# Patient Record
Sex: FEMALE
[De-identification: names, ages, dates, MRNs, and addresses within clinical notes are randomized per-mention and may not be internally consistent; named-entity substitution may affect disease eponyms.]

---

## 2019-03-31 ENCOUNTER — HOSPITAL ENCOUNTER (INPATIENT)
Dept: HOSPITAL 90 - EDH | Age: 54
LOS: 1 days | Discharge: HOME | DRG: 761 | End: 2019-04-01
Attending: OBSTETRICS & GYNECOLOGY | Admitting: OBSTETRICS & GYNECOLOGY
Payer: SELF-PAY

## 2019-03-31 VITALS — SYSTOLIC BLOOD PRESSURE: 115 MMHG | DIASTOLIC BLOOD PRESSURE: 48 MMHG

## 2019-03-31 VITALS — DIASTOLIC BLOOD PRESSURE: 64 MMHG | SYSTOLIC BLOOD PRESSURE: 133 MMHG

## 2019-03-31 VITALS — DIASTOLIC BLOOD PRESSURE: 48 MMHG | SYSTOLIC BLOOD PRESSURE: 117 MMHG

## 2019-03-31 VITALS — HEIGHT: 60 IN | WEIGHT: 168 LBS | BODY MASS INDEX: 32.98 KG/M2

## 2019-03-31 VITALS — DIASTOLIC BLOOD PRESSURE: 58 MMHG | SYSTOLIC BLOOD PRESSURE: 113 MMHG

## 2019-03-31 VITALS — DIASTOLIC BLOOD PRESSURE: 59 MMHG | SYSTOLIC BLOOD PRESSURE: 133 MMHG

## 2019-03-31 VITALS — SYSTOLIC BLOOD PRESSURE: 113 MMHG | DIASTOLIC BLOOD PRESSURE: 55 MMHG

## 2019-03-31 VITALS — SYSTOLIC BLOOD PRESSURE: 109 MMHG | DIASTOLIC BLOOD PRESSURE: 57 MMHG

## 2019-03-31 VITALS — SYSTOLIC BLOOD PRESSURE: 124 MMHG | DIASTOLIC BLOOD PRESSURE: 66 MMHG

## 2019-03-31 DIAGNOSIS — N92.0: Primary | ICD-10-CM

## 2019-03-31 DIAGNOSIS — I10: ICD-10-CM

## 2019-03-31 DIAGNOSIS — D50.0: ICD-10-CM

## 2019-03-31 LAB
ALBUMIN SERPL-MCNC: 3.4 G/DL (ref 3.5–5)
ALT SERPL-CCNC: 19 U/L (ref 12–78)
APTT PPP: 18.9 SEC (ref 26.3–35.5)
AST SERPL-CCNC: 18 U/L (ref 10–37)
BASOPHILS NFR BLD AUTO: 0.7 % (ref 0–5)
BILIRUB SERPL-MCNC: 0.2 MG/DL (ref 0.2–1)
BUN SERPL-MCNC: 10 MG/DL (ref 7–18)
CHLORIDE SERPL-SCNC: 102 MMOL/L (ref 101–111)
CK SERPL-CCNC: 59 U/L (ref 21–232)
CO2 SERPL-SCNC: 28 MMOL/L (ref 21–32)
CREAT SERPL-MCNC: 0.8 MG/DL (ref 0.5–1.5)
EOSINOPHIL NFR BLD AUTO: 0.4 % (ref 0–8)
ERYTHROCYTE [DISTWIDTH] IN BLOOD BY AUTOMATED COUNT: 15.9 % (ref 11–15.5)
GFR SERPL CREATININE-BSD FRML MDRD: 80 ML/MIN (ref 60–?)
GLUCOSE SERPL-MCNC: 145 MG/DL (ref 70–105)
HCT VFR BLD AUTO: 19.5 % (ref 36–48)
INR PPP: 0.94 (ref 0.85–1.15)
LYMPHOCYTES NFR SPEC AUTO: 10 % (ref 21–51)
MCH RBC QN AUTO: 27.2 PG (ref 27–33)
MCHC RBC AUTO-ENTMCNC: 31.4 G/DL (ref 32–36)
MCV RBC AUTO: 86.4 FL (ref 79–99)
MONOCYTES NFR BLD AUTO: 6.5 % (ref 3–13)
NEUTROPHILS NFR BLD AUTO: 82.4 % (ref 40–77)
NRBC BLD MANUAL-RTO: 0.1 % (ref 0–0.19)
PLATELET # BLD AUTO: 390 K/UL (ref 130–400)
POTASSIUM SERPL-SCNC: 3.5 MMOL/L (ref 3.5–5.1)
PROT SERPL-MCNC: 7.1 G/DL (ref 6–8.3)
PROTHROMBIN TIME: 9.9 SEC (ref 9.6–11.6)
RBC # BLD AUTO: 2.25 MIL/UL (ref 4–5.5)
RBC MORPH BLD: (no result)
SODIUM SERPL-SCNC: 139 MMOL/L (ref 136–145)
WBC # BLD AUTO: 11.7 K/UL (ref 4.8–10.8)

## 2019-03-31 PROCEDURE — 86900 BLOOD TYPING SEROLOGIC ABO: CPT

## 2019-03-31 PROCEDURE — 36430 TRANSFUSION BLD/BLD COMPNT: CPT

## 2019-03-31 PROCEDURE — 85014 HEMATOCRIT: CPT

## 2019-03-31 PROCEDURE — 86922 COMPATIBILITY TEST ANTIGLOB: CPT

## 2019-03-31 PROCEDURE — 81025 URINE PREGNANCY TEST: CPT

## 2019-03-31 PROCEDURE — 85025 COMPLETE CBC W/AUTO DIFF WBC: CPT

## 2019-03-31 PROCEDURE — 36415 COLL VENOUS BLD VENIPUNCTURE: CPT

## 2019-03-31 PROCEDURE — 84484 ASSAY OF TROPONIN QUANT: CPT

## 2019-03-31 PROCEDURE — 86850 RBC ANTIBODY SCREEN: CPT

## 2019-03-31 PROCEDURE — 85610 PROTHROMBIN TIME: CPT

## 2019-03-31 PROCEDURE — 85730 THROMBOPLASTIN TIME PARTIAL: CPT

## 2019-03-31 PROCEDURE — 86901 BLOOD TYPING SEROLOGIC RH(D): CPT

## 2019-03-31 PROCEDURE — 30233N1 TRANSFUSION OF NONAUTOLOGOUS RED BLOOD CELLS INTO PERIPHERAL VEIN, PERCUTANEOUS APPROACH: ICD-10-PCS | Performed by: INTERNAL MEDICINE

## 2019-03-31 PROCEDURE — 93005 ELECTROCARDIOGRAM TRACING: CPT

## 2019-03-31 PROCEDURE — 80053 COMPREHEN METABOLIC PANEL: CPT

## 2019-03-31 PROCEDURE — 85018 HEMOGLOBIN: CPT

## 2019-03-31 PROCEDURE — 76830 TRANSVAGINAL US NON-OB: CPT

## 2019-03-31 PROCEDURE — 82550 ASSAY OF CK (CPK): CPT

## 2019-03-31 PROCEDURE — 71046 X-RAY EXAM CHEST 2 VIEWS: CPT

## 2019-03-31 RX ADMIN — Medication SCH MLS/HR: at 23:40

## 2019-03-31 NOTE — NUR
PROCEDURE



TRANSVAGINAL EXAM IN PROGRESS, PT HAS BEEN ASSISTED TO BR TO VOID W/O ANY COMPLAINTS, SCANT 
VAGINAL BLEEDING NOTED, ENCOURAGED TO PUSH FLUIDS

-------------------------------------------------------------------------------

Addendum: 03/31/19 at 2209 by LÓPEZ ASH LVN

-------------------------------------------------------------------------------

Amended: Links added.

## 2019-03-31 NOTE — NUR
BLOOD TRANSFUSION



V/S WNL, PT ADVISED TO CALL NURSE FOR ANY ADVERSE REACTIONS, ACKNOWLEDGES UNDERSTANDING

-------------------------------------------------------------------------------

Addendum: 03/31/19 at 2213 by LÓPEZ ASH LVN

-------------------------------------------------------------------------------

Amended: Links added.

## 2019-04-01 VITALS — SYSTOLIC BLOOD PRESSURE: 126 MMHG | DIASTOLIC BLOOD PRESSURE: 71 MMHG

## 2019-04-01 VITALS — SYSTOLIC BLOOD PRESSURE: 126 MMHG | DIASTOLIC BLOOD PRESSURE: 63 MMHG

## 2019-04-01 VITALS — DIASTOLIC BLOOD PRESSURE: 73 MMHG | SYSTOLIC BLOOD PRESSURE: 124 MMHG

## 2019-04-01 VITALS — DIASTOLIC BLOOD PRESSURE: 72 MMHG | SYSTOLIC BLOOD PRESSURE: 131 MMHG

## 2019-04-01 LAB
HCT VFR BLD AUTO: 24.7 % (ref 36–48)
HCT VFR BLD AUTO: 26 % (ref 36–48)

## 2019-04-01 RX ADMIN — Medication SCH MLS/HR: at 13:00

## 2019-04-01 RX ADMIN — Medication SCH MLS/HR: at 00:34

## 2019-04-01 RX ADMIN — Medication SCH MLS/HR: at 06:52

## 2019-04-01 NOTE — NUR
ROUNDS

DR IDANIA ZARAGOZA AT BEDSIDE, ANSWERED ALL PT'S QUESTIONS AND CONCERNS; POC DISCUSSED, PT 
VERBALIZED UNDERSTANDING

## 2019-04-01 NOTE — NUR
VAGINAL BLEEDING



PAD HAD SCANT AMT PINKISH BLOOD,  URINE RED, BLOODY

-------------------------------------------------------------------------------

Addendum: 04/01/19 at 0143 by LÓPEZ ASH LVN

-------------------------------------------------------------------------------

Amended: Links added.

## 2019-04-01 NOTE — NUR
PAD COUNT

PT CHANGED PAD AFTER VOIDING, SCANT BLOOD NOTED ON PAD, WILL CONTINUE TO MONITOR

-------------------------------------------------------------------------------

Addendum: 04/01/19 at 1022 by FIORELLA RICKS RN

-------------------------------------------------------------------------------

Amended: Links added.

## 2019-04-01 NOTE — NUR
ACTIVITY



ASSISTED UP TO BR TO VOID, TOLERATED WELL, NO C/O DIZZINESS, BLOOD TRANSFUSION COMPLETE

-------------------------------------------------------------------------------

Addendum: 04/01/19 at 0131 by LÓPEZ ASH LVN

-------------------------------------------------------------------------------

Amended: Links added.

## 2019-04-01 NOTE — NUR
ACTIVITY

PT WAS ABLE TO AMBULATE TO BATHROOM, MINIMAL ASSISTANCE NEEDED; DID OWN KYLE-CARE, WAS ABLE 
TO VOID, AMBULATED BACK TO BED, TOLERATED WELL

## 2019-04-01 NOTE — NUR
Pt to bathroom, voiding without pain or discomfort; urine pink tinged; jackelyn pad noted with 
scant bleeding.  Pt providing self jackelyn care then returning to bed; denies dizziness, 
weakness or discomfort.  Pt requesting jello after eating half of sandwich provided; denies 
n/v at present.

-------------------------------------------------------------------------------

Addendum: 04/01/19 at 0509 by DERRICK LAYTON RN RN

-------------------------------------------------------------------------------

Amended: Links added.

## 2019-04-01 NOTE — NUR
ACTIVITY

PT AMBULATED TO BATHROOM, NO ASSISTANCE NEEDED BUT NEAR PT, PT WAS ABLE TO VOID 400+mL OF 
CLEAR PALE YELLOW URINE, WASHED HANDS, WAS ABLE TO AMBULATE BACK TO BED, NO DIZZINESS, 
FATIGUE NOTED, NO SOB; PT FEELS WELL, NO C/O PAIN

## 2019-04-01 NOTE — NUR
AVILA report received from CARLOS Nation LVN.

-------------------------------------------------------------------------------

Addendum: 04/01/19 at 0504 by DERRICK LAYTON RN RN

-------------------------------------------------------------------------------

Amended: Links added.

## 2019-04-07 ENCOUNTER — HOSPITAL ENCOUNTER (INPATIENT)
Dept: HOSPITAL 90 - EDH | Age: 54
LOS: 1 days | Discharge: HOME | DRG: 744 | End: 2019-04-08
Attending: INTERNAL MEDICINE | Admitting: INTERNAL MEDICINE
Payer: SELF-PAY

## 2019-04-07 VITALS — DIASTOLIC BLOOD PRESSURE: 47 MMHG | SYSTOLIC BLOOD PRESSURE: 100 MMHG

## 2019-04-07 VITALS — WEIGHT: 164 LBS | HEIGHT: 60 IN | BODY MASS INDEX: 32.2 KG/M2

## 2019-04-07 VITALS — SYSTOLIC BLOOD PRESSURE: 100 MMHG | DIASTOLIC BLOOD PRESSURE: 57 MMHG

## 2019-04-07 VITALS — SYSTOLIC BLOOD PRESSURE: 101 MMHG | DIASTOLIC BLOOD PRESSURE: 55 MMHG

## 2019-04-07 VITALS — SYSTOLIC BLOOD PRESSURE: 95 MMHG | DIASTOLIC BLOOD PRESSURE: 39 MMHG

## 2019-04-07 VITALS — SYSTOLIC BLOOD PRESSURE: 105 MMHG | DIASTOLIC BLOOD PRESSURE: 51 MMHG

## 2019-04-07 DIAGNOSIS — I95.9: ICD-10-CM

## 2019-04-07 DIAGNOSIS — T46.4X5A: ICD-10-CM

## 2019-04-07 DIAGNOSIS — N85.2: Primary | ICD-10-CM
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
## 2019-04-07 DIAGNOSIS — Z82.49: ICD-10-CM

## 2019-04-07 DIAGNOSIS — I10: ICD-10-CM

## 2019-04-07 DIAGNOSIS — N92.0: ICD-10-CM

## 2019-04-07 DIAGNOSIS — D62: ICD-10-CM

## 2019-04-07 LAB
ALBUMIN SERPL-MCNC: 3.6 G/DL (ref 3.5–5)
ALT SERPL-CCNC: 17 U/L (ref 12–78)
APPEARANCE UR: (no result)
APTT PPP: 21.9 SEC (ref 26.3–35.5)
AST SERPL-CCNC: 15 U/L (ref 10–37)
BASOPHILS NFR BLD AUTO: 0.5 % (ref 0–5)
BASOPHILS NFR BLD AUTO: 0.8 % (ref 0–5)
BILIRUB SERPL-MCNC: 0.2 MG/DL (ref 0.2–1)
BILIRUB UR QL STRIP: NEGATIVE
BUN SERPL-MCNC: 10 MG/DL (ref 7–18)
CHLORIDE SERPL-SCNC: 101 MMOL/L (ref 101–111)
CO2 SERPL-SCNC: 25 MMOL/L (ref 21–32)
COLOR UR: (no result)
CREAT SERPL-MCNC: 0.8 MG/DL (ref 0.5–1.5)
EOSINOPHIL NFR BLD AUTO: 0 % (ref 0–8)
EOSINOPHIL NFR BLD AUTO: 0.2 % (ref 0–8)
ERYTHROCYTE [DISTWIDTH] IN BLOOD BY AUTOMATED COUNT: 17.5 % (ref 11–15.5)
ERYTHROCYTE [DISTWIDTH] IN BLOOD BY AUTOMATED COUNT: 18.1 % (ref 11–15.5)
GFR SERPL CREATININE-BSD FRML MDRD: 80 ML/MIN (ref 60–?)
GLUCOSE SERPL-MCNC: 113 MG/DL (ref 70–105)
GLUCOSE UR STRIP-MCNC: NEGATIVE MG/DL
HCT VFR BLD AUTO: 24 % (ref 36–48)
HCT VFR BLD AUTO: 28.3 % (ref 36–48)
HCT VFR BLD AUTO: 30.5 % (ref 36–48)
HGB UR QL STRIP: (no result)
INR PPP: 1.01 (ref 0.85–1.15)
KETONES UR STRIP-MCNC: NEGATIVE MG/DL
LEUKOCYTE ESTERASE UR QL STRIP: NEGATIVE
LIPASE SERPL-CCNC: 218 U/L (ref 114–286)
LYMPHOCYTES NFR SPEC AUTO: 5.7 % (ref 21–51)
LYMPHOCYTES NFR SPEC AUTO: 7.1 % (ref 21–51)
MCH RBC QN AUTO: 27.3 PG (ref 27–33)
MCH RBC QN AUTO: 27.5 PG (ref 27–33)
MCHC RBC AUTO-ENTMCNC: 31.2 G/DL (ref 32–36)
MCHC RBC AUTO-ENTMCNC: 31.6 G/DL (ref 32–36)
MCV RBC AUTO: 87.2 FL (ref 79–99)
MCV RBC AUTO: 87.6 FL (ref 79–99)
MONOCYTES NFR BLD AUTO: 5.5 % (ref 3–13)
MONOCYTES NFR BLD AUTO: 6.9 % (ref 3–13)
NEUTROPHILS NFR BLD AUTO: 85.5 % (ref 40–77)
NEUTROPHILS NFR BLD AUTO: 87.8 % (ref 40–77)
NITRITE UR QL STRIP: NEGATIVE
NRBC BLD MANUAL-RTO: 0 % (ref 0–0.19)
NRBC BLD MANUAL-RTO: 0.1 % (ref 0–0.19)
PH UR STRIP: 6 [PH] (ref 5–8)
PLATELET # BLD AUTO: 251 K/UL (ref 130–400)
PLATELET # BLD AUTO: 278 K/UL (ref 130–400)
POTASSIUM SERPL-SCNC: 3.7 MMOL/L (ref 3.5–5.1)
PROT SERPL-MCNC: 7.2 G/DL (ref 6–8.3)
PROT UR QL STRIP: >=300 MG/DL
PROTHROMBIN TIME: 10.6 SEC (ref 9.6–11.6)
RBC # BLD AUTO: 3.25 MIL/UL (ref 4–5.5)
RBC # BLD AUTO: 3.48 MIL/UL (ref 4–5.5)
RBC #/AREA URNS HPF: (no result) /HPF (ref 0–1)
SODIUM SERPL-SCNC: 137 MMOL/L (ref 136–145)
SP GR UR STRIP: >=1.03 (ref 1–1.03)
UROBILINOGEN UR STRIP-MCNC: 0.2 MG/DL (ref 0.2–1)
WBC # BLD AUTO: 14.1 K/UL (ref 4.8–10.8)
WBC # BLD AUTO: 14.6 K/UL (ref 4.8–10.8)
WBC #/AREA URNS HPF: (no result) /HPF (ref 0–1)

## 2019-04-07 PROCEDURE — 84484 ASSAY OF TROPONIN QUANT: CPT

## 2019-04-07 PROCEDURE — 83690 ASSAY OF LIPASE: CPT

## 2019-04-07 PROCEDURE — 86900 BLOOD TYPING SEROLOGIC ABO: CPT

## 2019-04-07 PROCEDURE — 80053 COMPREHEN METABOLIC PANEL: CPT

## 2019-04-07 PROCEDURE — 99291 CRITICAL CARE FIRST HOUR: CPT

## 2019-04-07 PROCEDURE — 86850 RBC ANTIBODY SCREEN: CPT

## 2019-04-07 PROCEDURE — 93005 ELECTROCARDIOGRAM TRACING: CPT

## 2019-04-07 PROCEDURE — 85018 HEMOGLOBIN: CPT

## 2019-04-07 PROCEDURE — 36415 COLL VENOUS BLD VENIPUNCTURE: CPT

## 2019-04-07 PROCEDURE — 86901 BLOOD TYPING SEROLOGIC RH(D): CPT

## 2019-04-07 PROCEDURE — 85014 HEMATOCRIT: CPT

## 2019-04-07 PROCEDURE — 85730 THROMBOPLASTIN TIME PARTIAL: CPT

## 2019-04-07 PROCEDURE — 81001 URINALYSIS AUTO W/SCOPE: CPT

## 2019-04-07 PROCEDURE — 86922 COMPATIBILITY TEST ANTIGLOB: CPT

## 2019-04-07 PROCEDURE — 36430 TRANSFUSION BLD/BLD COMPNT: CPT

## 2019-04-07 PROCEDURE — 81025 URINE PREGNANCY TEST: CPT

## 2019-04-07 PROCEDURE — 85025 COMPLETE CBC W/AUTO DIFF WBC: CPT

## 2019-04-07 PROCEDURE — 85610 PROTHROMBIN TIME: CPT

## 2019-04-07 RX ADMIN — SODIUM CHLORIDE SCH MLS/HR: 0.9 INJECTION, SOLUTION INTRAVENOUS at 22:44

## 2019-04-07 RX ADMIN — FAMOTIDINE SCH MG: 10 INJECTION INTRAVENOUS at 21:50

## 2019-04-07 RX ADMIN — SODIUM CHLORIDE SCH MLS/HR: 0.9 INJECTION, SOLUTION INTRAVENOUS at 22:16

## 2019-04-07 NOTE — NUR
HISTORY OBTAINED AND PATIENT DENIES PAIN AT THIS TIME AND VERBALIZES FEELING OKAY.  PIV 
INFUSING WELL AND IS PATENT.  NO EDEMA OR LEAKAGE NOTED.

## 2019-04-07 NOTE — NUR
REPORT RECIEVED FROM FLAKITA MCNAMARA IN ER AND PATIENT TRANSFERED VIA STRETCHER TO ROOM.  PATIENT 
ORIENTED TO UNIT AND PIV INFUSING WITH NS AT 150CC/HR.  PATIENT VOIDED ON ADMISSION 450CC OF 
DARK RED BLOOD WITH URINE.  PATIENT WAS GIVEN KYLE PADS AND INFORMED TO VOID IN CONTAINER TO 
MONITOR VAGINAL BLEEDING AND DO PAD COUNT.

## 2019-04-08 VITALS — DIASTOLIC BLOOD PRESSURE: 60 MMHG | SYSTOLIC BLOOD PRESSURE: 129 MMHG

## 2019-04-08 VITALS — SYSTOLIC BLOOD PRESSURE: 113 MMHG | DIASTOLIC BLOOD PRESSURE: 59 MMHG

## 2019-04-08 VITALS — DIASTOLIC BLOOD PRESSURE: 63 MMHG | SYSTOLIC BLOOD PRESSURE: 119 MMHG

## 2019-04-08 VITALS — DIASTOLIC BLOOD PRESSURE: 70 MMHG | SYSTOLIC BLOOD PRESSURE: 120 MMHG

## 2019-04-08 VITALS — SYSTOLIC BLOOD PRESSURE: 121 MMHG | DIASTOLIC BLOOD PRESSURE: 58 MMHG

## 2019-04-08 VITALS — SYSTOLIC BLOOD PRESSURE: 119 MMHG | DIASTOLIC BLOOD PRESSURE: 52 MMHG

## 2019-04-08 VITALS — SYSTOLIC BLOOD PRESSURE: 118 MMHG | DIASTOLIC BLOOD PRESSURE: 81 MMHG

## 2019-04-08 VITALS — SYSTOLIC BLOOD PRESSURE: 120 MMHG | DIASTOLIC BLOOD PRESSURE: 61 MMHG

## 2019-04-08 VITALS — SYSTOLIC BLOOD PRESSURE: 97 MMHG | DIASTOLIC BLOOD PRESSURE: 65 MMHG

## 2019-04-08 VITALS — DIASTOLIC BLOOD PRESSURE: 57 MMHG | SYSTOLIC BLOOD PRESSURE: 111 MMHG

## 2019-04-08 VITALS — DIASTOLIC BLOOD PRESSURE: 58 MMHG | SYSTOLIC BLOOD PRESSURE: 117 MMHG

## 2019-04-08 VITALS — SYSTOLIC BLOOD PRESSURE: 105 MMHG | DIASTOLIC BLOOD PRESSURE: 51 MMHG

## 2019-04-08 VITALS — SYSTOLIC BLOOD PRESSURE: 119 MMHG | DIASTOLIC BLOOD PRESSURE: 64 MMHG

## 2019-04-08 VITALS — SYSTOLIC BLOOD PRESSURE: 123 MMHG | DIASTOLIC BLOOD PRESSURE: 89 MMHG

## 2019-04-08 VITALS — SYSTOLIC BLOOD PRESSURE: 120 MMHG | DIASTOLIC BLOOD PRESSURE: 60 MMHG

## 2019-04-08 VITALS — DIASTOLIC BLOOD PRESSURE: 60 MMHG | SYSTOLIC BLOOD PRESSURE: 121 MMHG

## 2019-04-08 VITALS — DIASTOLIC BLOOD PRESSURE: 60 MMHG | SYSTOLIC BLOOD PRESSURE: 102 MMHG

## 2019-04-08 VITALS — DIASTOLIC BLOOD PRESSURE: 62 MMHG | SYSTOLIC BLOOD PRESSURE: 115 MMHG

## 2019-04-08 VITALS — SYSTOLIC BLOOD PRESSURE: 110 MMHG | DIASTOLIC BLOOD PRESSURE: 56 MMHG

## 2019-04-08 VITALS — SYSTOLIC BLOOD PRESSURE: 117 MMHG | DIASTOLIC BLOOD PRESSURE: 61 MMHG

## 2019-04-08 VITALS — SYSTOLIC BLOOD PRESSURE: 118 MMHG | DIASTOLIC BLOOD PRESSURE: 58 MMHG

## 2019-04-08 LAB
HCT VFR BLD AUTO: 25.1 % (ref 36–48)
HCT VFR BLD AUTO: 27.4 % (ref 36–48)

## 2019-04-08 PROCEDURE — 0UDB8ZZ EXTRACTION OF ENDOMETRIUM, VIA NATURAL OR ARTIFICIAL OPENING ENDOSCOPIC: ICD-10-PCS | Performed by: OBSTETRICS & GYNECOLOGY

## 2019-04-08 PROCEDURE — 30233N1 TRANSFUSION OF NONAUTOLOGOUS RED BLOOD CELLS INTO PERIPHERAL VEIN, PERCUTANEOUS APPROACH: ICD-10-PCS | Performed by: OBSTETRICS & GYNECOLOGY

## 2019-04-08 RX ADMIN — FAMOTIDINE SCH MG: 10 INJECTION INTRAVENOUS at 08:37

## 2019-04-08 RX ADMIN — SODIUM CHLORIDE SCH MLS/HR: 0.9 INJECTION, SOLUTION INTRAVENOUS at 14:21

## 2019-04-08 RX ADMIN — SODIUM CHLORIDE SCH MLS/HR: 0.9 INJECTION, SOLUTION INTRAVENOUS at 03:17

## 2019-04-08 NOTE — NUR
DISCHARGE

PT LEFT UNIT VIA WHEELCHAIR, ACCOMPANIED BY FAMILY. DENIED PAIN AND HAD NO COMPLAINTS. 
TRANSPORTED BY PERSONAL VEHICLE.

## 2019-04-08 NOTE — NUR
CM CHART REVIEW.

NO TRIGGERS TO CM. AVAILABLE FOR CM IA IF TRIGGERS OR CONCNERS

-------------------------------------------------------------------------------

Addendum: 04/08/19 at 1352 by TIMOTEO ALMAZAN RN CM

-------------------------------------------------------------------------------

Amended: Links added.

## 2019-04-08 NOTE — NUR
TAMMIE Brown RN called:

TAMMIE Brown RN Charges called informed that she talks to Dr. De León about a consult order 
for this patient Pina and claimed that he knew her. No order given.

## 2019-04-08 NOTE — NUR
DR. JOAQUIN LINTON AT BEDSIDE TO ASSESS AND TALK TO PT. VAGINAL EXAM DONE. POC DISCUSSED AND 
PT VERBALIZED UNDERSTANDING. NEW ORDERS RECEIVED.

## 2019-04-08 NOTE — NUR
Communication:

POP Poole NP called via telephone informed that patient's Blood Pressure is low 100/47, 
95/39, latest 96/50, Hemoglobin and hematocrit 7.5 and hematocrit of 24.0; received orders 
to give bolus 500 ml of NS. Observe patient and call if next hemoglobin level is low.

NS bolus of 500 ml NS started.

## 2024-09-15 ENCOUNTER — HOSPITAL ENCOUNTER (EMERGENCY)
Dept: HOSPITAL 90 - EDH | Age: 59
Discharge: HOME | End: 2024-09-15
Payer: SELF-PAY

## 2024-09-15 VITALS — HEIGHT: 62 IN | BODY MASS INDEX: 31.28 KG/M2 | WEIGHT: 169.98 LBS

## 2024-09-15 VITALS
DIASTOLIC BLOOD PRESSURE: 81 MMHG | RESPIRATION RATE: 18 BRPM | OXYGEN SATURATION: 98 % | SYSTOLIC BLOOD PRESSURE: 155 MMHG | HEART RATE: 97 BPM | TEMPERATURE: 98.3 F

## 2024-09-15 DIAGNOSIS — I83.891: Primary | ICD-10-CM

## 2024-09-15 DIAGNOSIS — R30.0: ICD-10-CM

## 2024-09-15 DIAGNOSIS — Z79.899: ICD-10-CM

## 2024-09-15 LAB
APPEARANCE UR: CLEAR
BACTERIA URNS QL MICRO: (no result) /HPF
BILIRUB UR QL STRIP: NEGATIVE MG/DL
COLOR UR: (no result)
DEPRECATED SQUAMOUS URNS QL MICRO: (no result) /HPF (ref 0–2)
GLUCOSE UR STRIP-MCNC: NEGATIVE MG/DL
HGB UR QL STRIP: NEGATIVE
KETONES UR STRIP-MCNC: NEGATIVE MG/DL
LEUKOCYTE ESTERASE UR QL STRIP: NEGATIVE LEU/UL
MICRO URNS: YES
MUCOUS THREADS URNS QL MICRO: (no result) LPF
NITRITE UR QL STRIP: NEGATIVE
PH UR STRIP: 6 [PH] (ref 5–8)
PROT UR QL STRIP: 10 MG/DL
RBC #/AREA URNS HPF: (no result) /HPF (ref 0–1)
SP GR UR STRIP: 1.03 (ref 1–1.03)
UROBILINOGEN UR STRIP-MCNC: 2 MG/DL (ref 0.2–1)
WBC #/AREA URNS HPF: (no result) /HPF (ref 0–1)

## 2024-09-15 PROCEDURE — 81001 URINALYSIS AUTO W/SCOPE: CPT
